# Patient Record
Sex: FEMALE | Race: WHITE | NOT HISPANIC OR LATINO | Employment: OTHER | URBAN - METROPOLITAN AREA
[De-identification: names, ages, dates, MRNs, and addresses within clinical notes are randomized per-mention and may not be internally consistent; named-entity substitution may affect disease eponyms.]

---

## 2017-09-19 ENCOUNTER — HOSPITAL ENCOUNTER (OUTPATIENT)
Dept: MAMMOGRAPHY | Facility: MEDICAL CENTER | Age: 72
Discharge: HOME | End: 2017-09-19
Payer: MEDICARE

## 2017-09-19 DIAGNOSIS — Z12.31 VISIT FOR SCREENING MAMMOGRAM: ICD-10-CM

## 2017-09-19 PROCEDURE — G0202 SCR MAMMO BI INCL CAD: HCPCS

## 2018-11-09 ENCOUNTER — EVALUATION (OUTPATIENT)
Dept: PHYSICAL THERAPY | Facility: MEDICAL CENTER | Age: 73
End: 2018-11-09
Payer: MEDICARE

## 2018-11-09 DIAGNOSIS — R51.9 CRANIOFACIAL PAIN: ICD-10-CM

## 2018-11-09 DIAGNOSIS — G51.8 CRANIOFACIAL PAIN SYNDROME: Primary | ICD-10-CM

## 2018-11-09 PROCEDURE — 97162 PT EVAL MOD COMPLEX 30 MIN: CPT | Performed by: PHYSICAL THERAPIST

## 2018-11-09 PROCEDURE — G8991 OTHER PT/OT GOAL STATUS: HCPCS | Performed by: PHYSICAL THERAPIST

## 2018-11-09 PROCEDURE — G8990 OTHER PT/OT CURRENT STATUS: HCPCS | Performed by: PHYSICAL THERAPIST

## 2018-11-09 PROCEDURE — 97112 NEUROMUSCULAR REEDUCATION: CPT | Performed by: PHYSICAL THERAPIST

## 2018-11-09 NOTE — LETTER
2018    Roya Chongck, [address] [place] [address]    Patient: Bhavya Camarena   YOB: 1945    Date of Visit: 2018       Dear Dr Otf Griggs: Thank you for your recent referral of Bhavya Camarena  Please review the attached evaluation summary from [address] [place] [address] recent visit  Please verify that you agree with the plan of care by signing the attached document and sending it back to our office  If you have any questions or concerns, please don't hesitate to call  I sincerely appreciate the opportunity to share in the care of one of your patients and hope to have another opportunity to work with you in the near future  Sincerely,    Lorene Recinos, PT, DPT, PhD       Referring Provider:      I certify that I have read the below Plan of Care and certify the need for these services furnished under this plan of treatment while under my care  Roya Salas DDS  [address] [place] [address] Road: [phone]           PT Evaluation     Today's date: 2018  Patient name: Bhavya Camarena  : 1945  MRN: [id number]  Referring provider: Richard Monson DDS  Dx:   Encounter Diagnosis     ICD-10-CM    1  Craniofacial pain syndrome G51 8    2  Craniofacial pain R51                   Assessment    Assessment details: Bhavya Camarena is a pleasant 67 y o  female who presents with L craniofacial pain and difficulty opening her mouth s/p intubation during vaginal prolapse surgery on 10/11/2018  She has been noticing improvement slowly, but is unable to open her mouth fully which is limiting her ability to yawn, eat, and talk  Limitation and pain does not change much throughout the day  She does clench at night, but it does not appear to change her symptoms    She is not yet allowed to do exercises due to her recent surgery and notes that the vaginal prolapse made it difficult to stand upright which has impacted her posture over the past few years  The patient's greatest concerns are worry over not knowing what's wrong, fear of not being able to keep active and future ill health (and wanting to prevent it)  No further referral appears necessary at this time based upon examination results  While passive joint mobility was unremarkable, primary movement impairment diagnosis is 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  (primary encounter diagnosis)  Craniofacial pain and is limiting her ability to eat and yawn  Impairments include:  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   2) poor postural control - addressing with neuromotor retraining   3) poor cervicothoracic movement coordination - addressing with functional retraining  4) poor lumbopelvic movement coordination - addressing with neuromotor retraining   Understanding of Dx/Px/POC: good   Prognosis: good  Prognosis details: Positive prognostic indicators include positive attitude toward recovery  Negative prognostic indicators include obesity and chronicity of postural dysfunction  Goals  Patient will be independent with home exercise program    Patient will be able to manage symptoms independently  Patient will be able to eat without limitation due to pain  Patient will be able to yawn without limitation due to pain       Plan  Patient would benefit from: skilled PT  Planned therapy interventions: activity modification, joint mobilization, manual therapy, motor coordination training, neuromuscular re-education, patient education, self care, therapeutic activities, therapeutic exercise, home exercise program, behavior modification, postural training and functional ROM exercises  Treatment plan discussed with: patient  Plan details: Prognosis above is given PT services for 3 visits over the next 2 months and home program adherence  Subjective Evaluation    History of Present Illness  Mechanism of injury: Intubation from vaginal prolapse  Pain  Current pain ratin  At best pain ratin  At worst pain rating: 3          Objective     Static Posture     Head  Forward  Shoulders  Rounded  Postural Observations  Seated posture: poor  Standing posture: poor  Correction of posture: makes symptoms better        Palpation   Left   Hypertonic in the scalenes, sternocleidomastoid and upper trapezius  Tenderness of the scalenes, sternocleidomastoid and upper trapezius  Right   Hypertonic in the scalenes, sternocleidomastoid and upper trapezius  Tenderness of the scalenes, sternocleidomastoid and upper trapezius  Neurological Testing     Sensation   Cervical/Thoracic   Left   Intact: light touch    Right   Intact: light touch    Reflexes   Left   Biceps (C5/C6): normal (2+)  Garza's reflex: negative    Right   Biceps (C5/C6): normal (2+)  Garza's reflex: negative    Active Range of Motion   Cervical/Thoracic Spine   Normal active range of motion  Left Shoulder   Normal active range of motion    Right Shoulder   Normal active range of motion    Left Elbow   Normal active range of motion    Right Elbow   Normal active range of motion    Left Wrist   Normal active range of motion    Right Wrist   Normal active range of motion    Joint Play Joints within functional limits: C1, C2, C3, C4 and C5 Hypomobile: C6, C7, T1 and T2     Strength/Myotome Testing   Cervical Spine     Left   Normal strength    Right etr  Normal strength    Additional Strength Details  DNF strength poor    Tests   Cervical     Left   Negative alar ligament integrity, cervical distraction, Spurling's sign and transverse ligament  Right   Negative alar ligament integrity, cervical distraction, Spurling's sign and transverse ligament       Ambulation   Weight-Bearing Status   Weight-Bearing Status (Left): full weight bearing   Weight-Bearing Status (Right): full weight-bearing      Observational Gait   Gait: within functional limits   TMJ   Jaw observations: facial symmetry within normal limits  Patient presents with: no jaw trauma, no prognathia and no retrognathia  Occlusion class: class I (normal)  Buccal mucosa: signs of cheek biting  Patient does not have a  cleft palate  Dental findings: Mild scalloped tongue  Mild cusp wear noted  ROM: pain with movement  ROM comments: TMJ opening 37 mm with no deviation or deflection  TMJ lateral excursion 9mm to R, 8mm to L  Lateral bite test neg      Flowsheet Rows      Most Recent Value   PT/OT G-Codes   Current Score  97   Projected Score  98   Assessment Type  Evaluation   G code set  Other PT/OT Primary   Other PT Primary Current Status ()  CI   Other PT Primary Goal Status ()  CI          Precautions: vaginal prolapse surgery 10/11/2018    Date: 11/9       Manual                                                Active/  Assistive Interventions        TMJ relaxed position        Seated postural correction        TMJ controlled opening        Abdominal bracing (to be taught and initiated once healed postoperatively)       bridges (to be taught and initiated once healed postoperatively)                                       Modalities

## 2018-11-09 NOTE — PROGRESS NOTES
PT Evaluation     Today's date: 2018  Patient name: Arianne Sanches  : 1945  MRN: [id number]  Referring provider: Cordell Hernandez DDS  Dx:   Encounter Diagnosis     ICD-10-CM    1  Craniofacial pain syndrome G51 8    2  Craniofacial pain R51                   Assessment    Assessment details: Arianne Sanches is a pleasant 67 y o  female who presents with L craniofacial pain and difficulty opening her mouth s/p intubation during vaginal prolapse surgery on 10/11/2018  She has been noticing improvement slowly, but is unable to open her mouth fully which is limiting her ability to yawn, eat, and talk  Limitation and pain does not change much throughout the day  She does clench at night, but it does not appear to change her symptoms  She is not yet allowed to do exercises due to her recent surgery and notes that the vaginal prolapse made it difficult to stand upright which has impacted her posture over the past few years  The patient's greatest concerns are worry over not knowing what's wrong, fear of not being able to keep active and future ill health (and wanting to prevent it)  No further referral appears necessary at this time based upon examination results  While passive joint mobility was unremarkable, primary movement impairment diagnosis is 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  (primary encounter diagnosis)  Craniofacial pain and is limiting her ability to eat and yawn      Impairments include:  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   2) poor postural control - addressing with neuromotor retraining   3) poor cervicothoracic movement coordination - addressing with functional retraining  4) poor lumbopelvic movement coordination - addressing with neuromotor retraining   Understanding of Dx/Px/POC: good   Prognosis: good  Prognosis details: Positive prognostic indicators include positive attitude toward recovery  Negative prognostic indicators include obesity and chronicity of postural dysfunction  Goals  Patient will be independent with home exercise program    Patient will be able to manage symptoms independently  Patient will be able to eat without limitation due to pain  Patient will be able to yawn without limitation due to pain  Plan  Patient would benefit from: skilled PT  Planned therapy interventions: activity modification, joint mobilization, manual therapy, motor coordination training, neuromuscular re-education, patient education, self care, therapeutic activities, therapeutic exercise, home exercise program, behavior modification, postural training and functional ROM exercises  Treatment plan discussed with: patient  Plan details: Prognosis above is given PT services for 3 visits over the next 2 months and home program adherence  Subjective Evaluation    History of Present Illness  Mechanism of injury: Intubation from vaginal prolapse  Pain  Current pain ratin  At best pain ratin  At worst pain rating: 3          Objective     Static Posture     Head  Forward  Shoulders  Rounded  Postural Observations  Seated posture: poor  Standing posture: poor  Correction of posture: makes symptoms better        Palpation   Left   Hypertonic in the scalenes, sternocleidomastoid and upper trapezius  Tenderness of the scalenes, sternocleidomastoid and upper trapezius  Right   Hypertonic in the scalenes, sternocleidomastoid and upper trapezius  Tenderness of the scalenes, sternocleidomastoid and upper trapezius       Neurological Testing     Sensation   Cervical/Thoracic   Left   Intact: light touch    Right   Intact: light touch    Reflexes   Left   Biceps (C5/C6): normal (2+)  Garza's reflex: negative    Right   Biceps (C5/C6): normal (2+)  Garza's reflex: negative    Active Range of Motion   Cervical/Thoracic Spine   Normal active range of motion  Left Shoulder   Normal active range of motion    Right Shoulder   Normal active range of motion    Left Elbow   Normal active range of motion    Right Elbow   Normal active range of motion    Left Wrist   Normal active range of motion    Right Wrist   Normal active range of motion    Joint Play Joints within functional limits: C1, C2, C3, C4 and C5 Hypomobile: C6, C7, T1 and T2     Strength/Myotome Testing   Cervical Spine     Left   Normal strength    Right etr  Normal strength    Additional Strength Details  DNF strength poor    Tests   Cervical     Left   Negative alar ligament integrity, cervical distraction, Spurling's sign and transverse ligament  Right   Negative alar ligament integrity, cervical distraction, Spurling's sign and transverse ligament       Ambulation   Weight-Bearing Status   Weight-Bearing Status (Left): full weight bearing   Weight-Bearing Status (Right): full weight-bearing      Observational Gait   Gait: within functional limits   TMJ   Jaw observations: facial symmetry within normal limits  Patient presents with: no jaw trauma, no prognathia and no retrognathia  Occlusion class: class I (normal)  Buccal mucosa: signs of cheek biting  Patient does not have a  cleft palate  Dental findings: Mild scalloped tongue  Mild cusp wear noted  ROM: pain with movement  ROM comments: TMJ opening 37 mm with no deviation or deflection  TMJ lateral excursion 9mm to R, 8mm to L  Lateral bite test neg      Flowsheet Rows      Most Recent Value   PT/OT G-Codes   Current Score  97   Projected Score  98   Assessment Type  Evaluation   G code set  Other PT/OT Primary   Other PT Primary Current Status ()  CI   Other PT Primary Goal Status ()  CI          Precautions: vaginal prolapse surgery 10/11/2018    Date: 11/9       Manual                                                Active/  Assistive Interventions        TMJ relaxed position        Seated postural correction        TMJ controlled opening        Abdominal bracing (to be taught and initiated once healed postoperatively)       bridges (to be taught and initiated once healed postoperatively)                                       Modalities

## 2018-12-24 NOTE — PROGRESS NOTES
Bhavya Nicol never returned for follow-up as planned and did not respond to our phone calls  Thus it is presumed she is pleased with the HEP and progress that had followed, and no longer requires skilled PT services for this episode of care